# Patient Record
Sex: FEMALE | URBAN - METROPOLITAN AREA
[De-identification: names, ages, dates, MRNs, and addresses within clinical notes are randomized per-mention and may not be internally consistent; named-entity substitution may affect disease eponyms.]

---

## 2020-08-13 ENCOUNTER — OFFICE VISIT (OUTPATIENT)
Dept: ORTHOPEDICS | Facility: CLINIC | Age: 30
End: 2020-08-13
Payer: MEDICAID

## 2020-08-13 VITALS — HEIGHT: 61 IN | BODY MASS INDEX: 37.45 KG/M2 | TEMPERATURE: 99 F | WEIGHT: 198.38 LBS

## 2020-08-13 DIAGNOSIS — G56.03 BILATERAL CARPAL TUNNEL SYNDROME: Primary | ICD-10-CM

## 2020-08-13 PROCEDURE — 99203 PR OFFICE/OUTPT VISIT, NEW, LEVL III, 30-44 MIN: ICD-10-PCS | Mod: S$GLB,,, | Performed by: ORTHOPAEDIC SURGERY

## 2020-08-13 PROCEDURE — 99203 OFFICE O/P NEW LOW 30 MIN: CPT | Mod: S$GLB,,, | Performed by: ORTHOPAEDIC SURGERY

## 2020-08-13 RX ORDER — SERTRALINE HYDROCHLORIDE 25 MG/1
TABLET, FILM COATED ORAL
COMMUNITY
Start: 2020-05-07

## 2020-08-13 RX ORDER — ETONOGESTREL AND ETHINYL ESTRADIOL VAGINAL RING .015; .12 MG/D; MG/D
RING VAGINAL
COMMUNITY
Start: 2020-05-23

## 2020-08-13 NOTE — PROGRESS NOTES
Subjective:      Patient ID: Juan Clark is a 30 y.o. female.    Chief Complaint: Pain of the Right Hand and Pain of the Left Hand    HPI 30-year-old lady with a 2 year history of bilateral upper  extremity numbness in both the median and ulnar nerve distribution.  It is worse on the left than the right.  She has tried splinting without relief.  She had an injection in the past with short-term relief    Review of Systems   Constitution: Negative for fever and weight loss.   Cardiovascular: Negative for chest pain and leg swelling.   Musculoskeletal: Negative for arthritis, joint pain, joint swelling, muscle weakness and stiffness.   Gastrointestinal: Negative for change in bowel habit.   Genitourinary: Negative for bladder incontinence and hematuria.   Neurological: Positive for focal weakness, numbness, paresthesias and sensory change.         Objective:      Active and passive range of motion of both wrists is normal.  She has decreased sensation to light touch in both the median and ulnar nerve distribution.  She has a positive carpal compression test.    She has normal pulses      Ortho/SPM Exam            Assessment:       Encounter Diagnosis   Name Primary?    Bilateral carpal tunnel syndrome Yes          Plan:       Juan was seen today for pain and pain.    Diagnoses and all orders for this visit:    Bilateral carpal tunnel syndrome     Recommend carpal tunnel and cubital tunnel decompression.  We will do this in the near future at her convenience

## 2020-08-19 LAB
ANION GAP SERPL CALC-SCNC: 7 MMOL/L (ref 3–11)
BASOPHILS NFR SNV MANUAL: 0.8 % (ref 0–3)
BUN SERPL-MCNC: 11 MG/DL (ref 7–18)
BUN/CREAT SERPL: 13.92 RATIO (ref 7–18)
CALCIUM BLD-MCNC: NEGATIVE MG/DL
CALCIUM SERPL-MCNC: 9.3 MG/DL (ref 8.8–10.5)
CHLORIDE SERPL-SCNC: 106 MMOL/L (ref 100–108)
CO2 SERPL-SCNC: 28 MMOL/L (ref 21–32)
COV PREGNANCY STATUS: NORMAL
COVID-19 AB, IGM: NEGATIVE
CREAT SERPL-MCNC: 0.79 MG/DL (ref 0.55–1.02)
EOSINOPHIL NFR SNV MANUAL: 0.7 % (ref 1–3)
ERYTHROCYTE [DISTWIDTH] IN BLOOD BY AUTOMATED COUNT: 12 % (ref 12.5–18)
GFR ESTIMATION: > 60
GLUCOSE SERPL-MCNC: 83 MG/DL (ref 70–110)
HCT VFR BLD AUTO: 43.4 % (ref 37–47)
HGB BLD-MCNC: 13.7 G/DL (ref 12–16)
LYMPHOCYTES NFR SNV MANUAL: 33.2 % (ref 25–40)
MANUAL NRBC PER 100 CELLS: 0 %
MCH RBC QN AUTO: 28.4 PG (ref 27–31.2)
MCHC RBC AUTO-ENTMCNC: 31.6 G/DL (ref 31.8–35.4)
MCV RBC AUTO: 89.9 FL (ref 80–97)
MONOCYTES/100 LEUKOCYTES: 8.2 % (ref 1–15)
NEUTROPHILS NFR BLD: 4.3 10*3/UL (ref 1.8–7.7)
NEUTROPHILS NFR SNV MANUAL: 57 % (ref 37–80)
PATIENT EMPLOYED IN HEALTHCARE: NO
PATIENT HAS SYMPTOMS FOR CONDITION OF INTEREST: NO
PATIENT HOSPITALIZED BC COND: NO
PATIENT IN CONGREGATE CARE: NO
PLATELETS: 274 10*3/UL (ref 142–424)
POTASSIUM SERPL-SCNC: 4.4 MMOL/L (ref 3.6–5.2)
RBC # BLD AUTO: 4.83 10*6/UL (ref 4.2–5.4)
SODIUM BLD-SCNC: 141 MMOL/L (ref 135–145)
WBC # BLD: 7.5 10*3/UL (ref 4.6–10.2)

## 2020-08-24 ENCOUNTER — TELEPHONE (OUTPATIENT)
Dept: ORTHOPEDICS | Facility: CLINIC | Age: 30
End: 2020-08-24

## 2020-08-24 NOTE — TELEPHONE ENCOUNTER
8/24/20  11:10am   Spoke w/ patient.    Wants to know when to be at hospital on Wednesday morning.    She will get a call on Tuesday afternoon with a specific time.

## 2020-08-24 NOTE — TELEPHONE ENCOUNTER
----- Message from Yuan Romo sent at 8/24/2020  9:54 AM CDT -----  Regarding: Appt question  Please call Juan to discuss an appt question she has [phone] (home).

## 2020-09-22 LAB
ANION GAP SERPL CALC-SCNC: 8 MMOL/L (ref 3–11)
B-HCG UR QL: NEGATIVE
BASOPHILS NFR SNV MANUAL: 0.9 % (ref 0–3)
BUN SERPL-MCNC: 9 MG/DL (ref 7–18)
BUN/CREAT SERPL: 13.04 RATIO (ref 7–18)
CALCIUM BLD-MCNC: NEGATIVE MG/DL
CALCIUM SERPL-MCNC: 9.4 MG/DL (ref 8.8–10.5)
CHLORIDE SERPL-SCNC: 103 MMOL/L (ref 100–108)
CO2 SERPL-SCNC: 30 MMOL/L (ref 21–32)
COV PREGNANCY STATUS: NORMAL
COVID-19 AB, IGM: NEGATIVE
CREAT SERPL-MCNC: 0.69 MG/DL (ref 0.55–1.02)
EOSINOPHIL NFR SNV MANUAL: 0.8 % (ref 1–3)
ERYTHROCYTE [DISTWIDTH] IN BLOOD BY AUTOMATED COUNT: 12.3 % (ref 12.5–18)
GFR ESTIMATION: > 60
GLUCOSE SERPL-MCNC: 95 MG/DL (ref 70–110)
HCT VFR BLD AUTO: 45.6 % (ref 37–47)
HGB BLD-MCNC: 14.2 G/DL (ref 12–16)
LYMPHOCYTES NFR SNV MANUAL: 30.4 % (ref 25–40)
MANUAL NRBC PER 100 CELLS: 0 %
MCH RBC QN AUTO: 28.6 PG (ref 27–31.2)
MCHC RBC AUTO-ENTMCNC: 31.1 G/DL (ref 31.8–35.4)
MCV RBC AUTO: 91.8 FL (ref 80–97)
MONOCYTES/100 LEUKOCYTES: 7.9 % (ref 1–15)
NEUTROPHILS NFR BLD: 3.77 10*3/UL (ref 1.8–7.7)
NEUTROPHILS NFR SNV MANUAL: 59.7 % (ref 37–80)
PATIENT EMPLOYED IN HEALTHCARE: NO
PATIENT HAS SYMPTOMS FOR CONDITION OF INTEREST: NO
PATIENT HOSPITALIZED BC COND: NO
PATIENT IN CONGREGATE CARE: NO
PLATELETS: 276 10*3/UL (ref 142–424)
POTASSIUM SERPL-SCNC: 4.3 MMOL/L (ref 3.6–5.2)
RBC # BLD AUTO: 4.97 10*6/UL (ref 4.2–5.4)
SODIUM BLD-SCNC: 141 MMOL/L (ref 135–145)
WBC # BLD: 6.3 10*3/UL (ref 4.6–10.2)

## 2020-09-23 ENCOUNTER — OUTSIDE PLACE OF SERVICE (OUTPATIENT)
Dept: ADMINISTRATIVE | Facility: OTHER | Age: 30
End: 2020-09-23
Payer: MEDICAID

## 2020-09-23 PROCEDURE — 64721 PR REVISE MEDIAN N/CARPAL TUNNEL SURG: ICD-10-PCS | Mod: LT,,, | Performed by: ORTHOPAEDIC SURGERY

## 2020-09-23 PROCEDURE — 64719 PR REVISE ULNAR NERVE AT WRIST: ICD-10-PCS | Mod: 59,51,LT, | Performed by: ORTHOPAEDIC SURGERY

## 2020-09-23 PROCEDURE — 64719 REVISE ULNAR NERVE AT WRIST: CPT | Mod: 59,51,LT, | Performed by: ORTHOPAEDIC SURGERY

## 2020-09-23 PROCEDURE — 64721 CARPAL TUNNEL SURGERY: CPT | Mod: LT,,, | Performed by: ORTHOPAEDIC SURGERY

## 2020-10-01 ENCOUNTER — OFFICE VISIT (OUTPATIENT)
Dept: ORTHOPEDICS | Facility: CLINIC | Age: 30
End: 2020-10-01
Payer: MEDICAID

## 2020-10-01 DIAGNOSIS — G56.03 BILATERAL CARPAL TUNNEL SYNDROME: Primary | ICD-10-CM

## 2020-10-01 PROCEDURE — 99024 POSTOP FOLLOW-UP VISIT: CPT | Mod: S$GLB,,, | Performed by: ORTHOPAEDIC SURGERY

## 2020-10-01 PROCEDURE — 99024 PR POST-OP FOLLOW-UP VISIT: ICD-10-PCS | Mod: S$GLB,,, | Performed by: ORTHOPAEDIC SURGERY

## 2020-10-01 RX ORDER — HYDROCODONE BITARTRATE AND ACETAMINOPHEN 5; 325 MG/1; MG/1
1 TABLET ORAL 3 TIMES DAILY PRN
COMMUNITY
Start: 2020-09-23

## 2020-10-01 NOTE — PROGRESS NOTES
Subjective:      Patient ID: Juan Clark is a 30 y.o. female.    Chief Complaint: Post-op Evaluation of the Left Hand    HPI patient is 1 week postop left carpal tunnel release with Guyon's canal expiration.  She is doing well and her sensation is nearly back to normal    ROS unchanged from prior visit      Objective:        Range of motion of the hand and wrist is normal.  The incision is clean without drainage.  She has minimal swelling.    Ortho/SPM Exam            Assessment:       Encounter Diagnosis   Name Primary?    Bilateral carpal tunnel syndrome Yes          Plan:       Juan was seen today for post-op evaluation.    Diagnoses and all orders for this visit:    Bilateral carpal tunnel syndrome     Incision is redressed.  Return 1 week for suture removal